# Patient Record
(demographics unavailable — no encounter records)

---

## 2024-10-31 NOTE — HISTORY OF PRESENT ILLNESS
[FreeTextEntry1] : Damian is a 15 y/o male with ADHD and is here for a follow up s/p seen 08/14/24 and increasing Adderall XR to 20 mg.  He was initially seen 07/24/24 for management recommendations and was started on Adderall XR 10 mg.  He was diagnosed with ADHD when he was in 5th grade by [facility] and had never taken ADHD medication.  He is a bright student however in the past year has been struggling academically.  Mother was considering having him in therapy at [facility] in [place].  PLAN FROM PREVIOUS VISIT - Increase Adderall XR to 20 mg once daily (may take 2X 10 mg capsules for 5 days first, call if side effects). -Psychoeducation provided re: ADHD.  Resources for education given -Discussed management for ADHD including behavioral interventions and medication options. -Counseling on ADHD medication; stimulants are 1st line treatment. Discussed stimulant medications; risks, benefits, possible side effects. -Letter to request 504 Plan for school previously provided -Mother taking him to [facility] to see if therapist would be indicated.  -Follow up in 1 month (mother may call next week if not tolerating Adderall XR 20 mg, to switch to Concerta 18 mg).   INTERIM HPI  Taking Adderall XR 25 mg (did not have 20 mg in stock). Patient reports he notices it helps him to stay focused, though still loses focus in science class which is  a double period. He doesn't have a lot of homework. He found transition to high school to be smooth, and is doing better in school this year.  No missing assignments.  He denies problems taking medication.  Has been eating and sleeping well.    Father is happy with improvement in school.  He agreed no missing assignments "which is huge."   Denies problems with mood.    HPI FROM VISIT ON 08/14/24  Damian is a 15 y/o male with ADHD and is here for a follow up s/p seen 07/24/24 for management recommendations.  He was diagnosed with ADHD when he was in 5th grade by [facility] and has never taken ADHD medication.  He is a bright student however in the past year has been struggling academically.  Recent [employer] forms were consistent with symptoms and impairments related to ADHD, and was started on Adderall XR 10 mg.    PLAN FROM PREVIOUS VISIT -Start Adderall XR 10 mg once daily in the morning (if not available, may try Concerta).  -Psychoeducation provided re: ADHD.  Resources for education given -Discussed management for ADHD including behavioral interventions and medication options. -Counseling on ADHD medication; stimulants are 1st line treatment. Discussed stimulant medications; risks, benefits, possible side effects. -Letter to request 504 Plan for school provided -Follow up in 3 weeks.    INTERIM HPI Has been taking Adderall XR 10 mg consistently.   Patient feels less talkative, no mood, but not depressed.  He has not noticed if it helps with focus as he's not in school.  Mother has not noticed any changes.  He's always in the basement, and he's usually very quiet.  Father took him to a mental health clinic (father had an appointment for himself and inquired about Damian) at [facility] in [place].  They said he could only take him if Adderall is not being prescribed by another provider.  Mother was not there.  The therapist noted that Damian fidgeted a lot. Damian denies feeling anxious or depressed.    HPI FROM VISIT ON 07/24/24  Damian is a 15 y/o male with ADHD, here for re-evaluation and management recommendations.   Patient lives with parents and is enrolled is enrolled in 8th grade at [employer] in [place].  He was diagnosed with ADHD in 5th grade at [facility].  He had an EEG and cardiology clearance.  Medication was not recommended by neurologist at the time as he was doing very well in school. He has been taking all honors classes, however started having behavior problems, and over the past 2 years, more problems with assignment completion, missing assignments, and disrupting class.  Mother feels she needs to help with stay organized and remind him constantly to get his work done.   Had [employer] forms repeated at the beginning of the school year last year which did not show significant teacher reported symptoms, so NP at [employer] refused to start ADHD medication, stated he didn't have enough symptoms and he did well in school.  By the end of the school years, his grades declined, he failed 2 regents tests, so Mother had [employer] forms repeated at the end of the year. [employer] Teacher forms completed on 05/06/24 provided by parent: TEACHER #1  (Kelly, 1A) Inattentive:  9/9 + Hyperactive: 5 /9- ODD/CD  2/10- Anxiety/depression:  not scored SIGNIFICANT IMPAIRMENTS IN ALL OF THE FOLLOWING  . Relationships with peers   Following directions - Disrupting class - Assignment completion-. Organizational skills.   TEACHER #2 : RAF Gutierrez (algebra) Inattentive:  4/9 Hyperactive:  0/9 ODD/CD  0/10 Anxiety/depression:  0 /7 IMPAIRMENTS IN MATH (4= SOMEWHAT OF A PROBLEM)  Mother had him going to [employer] as was failing math, though he did pass math [employer].  He was in [employer] math, next year will be in regular (non-honors). Will be in honors history and social studies, but no other honor classes.   Damian endorses he gets distracted in the class.  He gets very distracted by any noise, even if he hears a bird chirping.  He does his homework at home and gets bored and distracted easily.  He sometimes has a hard time staying seated.  Mother notices he can't not touch everything within reach. He can't sit still, always needs to be walking around.   He can't perform multiple tasks at once.  He loses everything; he lost his student ID 3 times.  He is very disorganized. He had a lot of missing assignments.   HIs teacher had to have teacher initial his assignment book to make sure he didn't forget to write it down.  Patient denies anxiety, depression, SI, or self-injurious behavior.  Patient also denies substance use including alcohol or marijuana.     DEVELOPMENTAL HX  Child was born full term without complications and reached all age-appropriate developmental milestones without concerns. Early Intervention Services were not needed.   CURRENT SCHOOL -School: [employer] in [place], 9th grade  -Public school  -Special Ed services-No 504 plan or IEP - General education classroom -Accommodations-none -Academic performance/grades: was on honor roll, grade declined.     MEDICAL HISTORY: Denies a history of tics Denies history of starting spells, twitching, seizure-like activity.   FAMILY HISTORY: Family history of ADHD: aunt, grandmother (takes Adderall XR) Family history of anxiety or depression: denies Denies family history of cardiac conditions or early unexplained deaths.

## 2024-10-31 NOTE — ASSESSMENT
[FreeTextEntry1] : Damian is a 15 y/o male with ADHD and is here for a follow up s/p seen 08/14/24 and increasing Adderall XR to 25 mg.  He was initially seen 07/24/24.  He was diagnosed with ADHD when he was in 5th grade by [facility] and had never taken ADHD medication.  He was struggling academically however this year doing much better.  Oppositional behaviors have improved.  Will increase Adderall XR to 30 mg as patient reporting noticeable improvement but still with some focusing difficulties.

## 2024-10-31 NOTE — ASSESSMENT
[FreeTextEntry1] : Damian is a 13 y/o male with ADHD and is here for a follow up s/p seen 08/14/24 and increasing Adderall XR to 25 mg.  He was initially seen 07/24/24.  He was diagnosed with ADHD when he was in 5th grade by [facility] and had never taken ADHD medication.  He was struggling academically however this year doing much better.  Oppositional behaviors have improved.  Will increase Adderall XR to 30 mg as patient reporting noticeable improvement but still with some focusing difficulties.

## 2024-10-31 NOTE — PHYSICAL EXAM
[Well-appearing] : well-appearing [Alert] : alert [Well related, good eye contact] : well related, good eye contact [Conversant] : conversant [Normal speech and language] : normal speech and language [Follows instructions well] : follows instructions well [Gross hearing intact] : gross hearing intact [de-identified] : Interactive and appropriate mood, friendly.

## 2024-10-31 NOTE — PHYSICAL EXAM
[Well-appearing] : well-appearing [Alert] : alert [Well related, good eye contact] : well related, good eye contact [Conversant] : conversant [Normal speech and language] : normal speech and language [Follows instructions well] : follows instructions well [Gross hearing intact] : gross hearing intact [de-identified] : Interactive and appropriate mood, friendly.

## 2024-10-31 NOTE — PLAN
[FreeTextEntry1] : - Increase Adderall XR to 30 mg -Psychoeducation provided re: ADHD.   - Discussed stimulant medications, risks, benefits, possible side effects. -Continue 504 Plan -Follow up in 3-4 months.

## 2024-10-31 NOTE — HISTORY OF PRESENT ILLNESS
[FreeTextEntry1] : Damian is a 13 y/o male with ADHD and is here for a follow up s/p seen 08/14/24 and increasing Adderall XR to 20 mg.  He was initially seen 07/24/24 for management recommendations and was started on Adderall XR 10 mg.  He was diagnosed with ADHD when he was in 5th grade by [facility] and had never taken ADHD medication.  He is a bright student however in the past year has been struggling academically.  Mother was considering having him in therapy at [facility] in [place].  PLAN FROM PREVIOUS VISIT - Increase Adderall XR to 20 mg once daily (may take 2X 10 mg capsules for 5 days first, call if side effects). -Psychoeducation provided re: ADHD.  Resources for education given -Discussed management for ADHD including behavioral interventions and medication options. -Counseling on ADHD medication; stimulants are 1st line treatment. Discussed stimulant medications; risks, benefits, possible side effects. -Letter to request 504 Plan for school previously provided -Mother taking him to [facility] to see if therapist would be indicated.  -Follow up in 1 month (mother may call next week if not tolerating Adderall XR 20 mg, to switch to Concerta 18 mg).   INTERIM HPI  Taking Adderall XR 25 mg (did not have 20 mg in stock). Patient reports he notices it helps him to stay focused, though still loses focus in science class which is  a double period. He doesn't have a lot of homework. He found transition to high school to be smooth, and is doing better in school this year.  No missing assignments.  He denies problems taking medication.  Has been eating and sleeping well.    Father is happy with improvement in school.  He agreed no missing assignments "which is huge."   Denies problems with mood.    HPI FROM VISIT ON 08/14/24  Damian is a 13 y/o male with ADHD and is here for a follow up s/p seen 07/24/24 for management recommendations.  He was diagnosed with ADHD when he was in 5th grade by [facility] and has never taken ADHD medication.  He is a bright student however in the past year has been struggling academically.  Recent [employer] forms were consistent with symptoms and impairments related to ADHD, and was started on Adderall XR 10 mg.    PLAN FROM PREVIOUS VISIT -Start Adderall XR 10 mg once daily in the morning (if not available, may try Concerta).  -Psychoeducation provided re: ADHD.  Resources for education given -Discussed management for ADHD including behavioral interventions and medication options. -Counseling on ADHD medication; stimulants are 1st line treatment. Discussed stimulant medications; risks, benefits, possible side effects. -Letter to request 504 Plan for school provided -Follow up in 3 weeks.    INTERIM HPI Has been taking Adderall XR 10 mg consistently.   Patient feels less talkative, no mood, but not depressed.  He has not noticed if it helps with focus as he's not in school.  Mother has not noticed any changes.  He's always in the basement, and he's usually very quiet.  Father took him to a mental health clinic (father had an appointment for himself and inquired about Damian) at [facility] in [place].  They said he could only take him if Adderall is not being prescribed by another provider.  Mother was not there.  The therapist noted that Damian fidgeted a lot. Damian denies feeling anxious or depressed.    HPI FROM VISIT ON 07/24/24  Damian is a 13 y/o male with ADHD, here for re-evaluation and management recommendations.   Patient lives with parents and is enrolled is enrolled in 8th grade at [employer] in [place].  He was diagnosed with ADHD in 5th grade at [facility].  He had an EEG and cardiology clearance.  Medication was not recommended by neurologist at the time as he was doing very well in school. He has been taking all honors classes, however started having behavior problems, and over the past 2 years, more problems with assignment completion, missing assignments, and disrupting class.  Mother feels she needs to help with stay organized and remind him constantly to get his work done.   Had [employer] forms repeated at the beginning of the school year last year which did not show significant teacher reported symptoms, so NP at [facility] refused to start ADHD medication, stated he didn't have enough symptoms and he did well in school.  By the end of the school years, his grades declined, he failed 2 regents tests, so Mother had [employer] forms repeated at the end of the year. [employer] Teacher forms completed on 05/06/24 provided by parent: TEACHER #1  (Kelly, 1A) Inattentive:  9/9 + Hyperactive: 5 /9- ODD/CD  2/10- Anxiety/depression:  not scored SIGNIFICANT IMPAIRMENTS IN ALL OF THE FOLLOWING  . Relationships with peers   Following directions - Disrupting class - Assignment completion-. Organizational skills.   TEACHER #2 : RAF Gutierrez (algebra) Inattentive:  4/9 Hyperactive:  0/9 ODD/CD  0/10 Anxiety/depression:  0 /7 IMPAIRMENTS IN MATH (4= SOMEWHAT OF A PROBLEM)  Mother had him going to [employer] as was failing math, though he did pass math [employer].  He was in [employer] math, next year will be in regular (non-honors). Will be in honors history and social studies, but no other honor classes.   Damian endorses he gets distracted in the class.  He gets very distracted by any noise, even if he hears a bird chirping.  He does his homework at home and gets bored and distracted easily.  He sometimes has a hard time staying seated.  Mother notices he can't not touch everything within reach. He can't sit still, always needs to be walking around.   He can't perform multiple tasks at once.  He loses everything; he lost his student ID 3 times.  He is very disorganized. He had a lot of missing assignments.   HIs teacher had to have teacher initial his assignment book to make sure he didn't forget to write it down.  Patient denies anxiety, depression, SI, or self-injurious behavior.  Patient also denies substance use including alcohol or marijuana.     DEVELOPMENTAL HX  Child was born full term without complications and reached all age-appropriate developmental milestones without concerns. Early Intervention Services were not needed.   CURRENT SCHOOL -School: [employer] in [place], 9th grade  -Public school  -Special Ed services-No 504 plan or IEP - General education classroom -Accommodations-none -Academic performance/grades: was on honor roll, grade declined.     MEDICAL HISTORY: Denies a history of tics Denies history of starting spells, twitching, seizure-like activity.   FAMILY HISTORY: Family history of ADHD: aunt, grandmother (takes Adderall XR) Family history of anxiety or depression: denies Denies family history of cardiac conditions or early unexplained deaths.

## 2025-04-14 NOTE — CONSULT LETTER
[Courtesy Letter:] : I had the pleasure of seeing your patient, [unfilled], in my office today. [Please see my note below.] : Please see my note below. [Sincerely,] : Sincerely, [FreeTextEntry3] : Jessica Menendez, PNP-PC, [facility] Division of Pediatric Neurology [address], [address] [id number] [phone]

## 2025-04-14 NOTE — REASON FOR VISIT
[Patient] : patient [Mother] : mother [Home] : at home, [unfilled] , at the time of the visit. [Medical Office: ([facility])___] : at the medical office located in  [Telehealth (audio & video)] : This visit was provided via telehealth using real-time 2-way audio visual technology. [FreeTextEntry3] : mother

## 2025-04-14 NOTE — CONSULT LETTER
[Courtesy Letter:] : I had the pleasure of seeing your patient, [unfilled], in my office today. [Please see my note below.] : Please see my note below. [Sincerely,] : Sincerely, [FreeTextEntry3] : Jessica Menendez, PNP-PC, [facility] Division of Pediatric Neurology [address], [address] [address] [phone]

## 2025-04-14 NOTE — HISTORY OF PRESENT ILLNESS
[FreeTextEntry1] : Damian is a 13 y/o (almost 15 y/o)  male with ADHD and is here for a follow up s/p seen 10/30/25 and increasing Adderall XR to 39 mg.  He was initially seen 07/24/24.  He was diagnosed with ADHD when he was in 5th grade by [facility] and had never taken ADHD medication.  He was struggling academically however this year doing much better.  Oppositional behaviors had improved.  On Adderall XR 25 mg patient reported noticeable improvement but still with some focusing difficulties.  PLAN FROM PREVIOUS VISIT - Increase Adderall XR to 30 mg -Psychoeducation provided re: ADHD.   - Discussed stimulant medications, risks, benefits, possible side effects. -Continue 504 Plan -Follow up in 3-4 months.   INTERIM HPI Spoke to father 11/27/24, was having Adderall shortage issues' Sent Rx for 20 mg IR Adderall BID, recently refilled Adderall XR 30 mg (01/07 and 02/18/25). Taking Adderall XR 30 mg but not consistent as doesn't like how he feels. Patient feels he is more calm, doesn't talk as much, doesn't eat as much.  Doesn't want to do anything.  Mother feels when he doesn't take the medicine, he is much more annoying to others. She thinks he doesn't always take it. Grades are "terrible" Some 60's and 70's.  He gets 100 on homework which is done in school.  The quizzes and tests are the problems.   He comes home and rushes through his work.   No 504 Plan- doesn't feel he needs it.       HPI FROM VISIT ON 10/30/25  Damian is a 13 y/o male with ADHD and is here for a follow up s/p seen 08/14/24 and increasing Adderall XR to 20 mg.  He was initially seen 07/24/24 for management recommendations and was started on Adderall XR 10 mg.  He was diagnosed with ADHD when he was in 5th grade by [facility] and had never taken ADHD medication.  He is a bright student however in the past year has been struggling academically.  [facility] was [facility] [place].  PLAN FROM PREVIOUS VISIT - Increase Adderall XR to 20 mg once daily (may take 2X 10 mg capsules for 5 days first, call if side effects). -Psychoeducation provided re: ADHD.  Resources for education given -Discussed management for ADHD including behavioral interventions and medication options. -Counseling on ADHD medication; stimulants are 1st line treatment. Discussed stimulant medications; risks, benefits, possible side effects. -Letter to request 504 Plan for school previously provided -Mother taking him to [facility] to see if therapist would be indicated.  -Follow up in 1 month (mother may call next week if not tolerating Adderall XR 20 mg, to switch to Concerta 18 mg).   INTERIM HPI  Taking Adderall XR 25 mg (did not have 20 mg in stock). Patient reports he notices it helps him to stay focused, though still loses focus in science class which is  a double period. He doesn't have a lot of homework. He found transition to high school to be smooth, and is doing better in school this year.  No missing assignments.  He denies problems taking medication.  Has been eating and sleeping well.    Father is happy with improvement in school.  He agreed no missing assignments "which is huge."   Denies problems with mood.    HPI FROM VISIT ON 08/14/24  Damian is a 13 y/o male with ADHD and is here for a follow up s/p seen 07/24/24 for management recommendations.  He was diagnosed with ADHD when he was in 5th grade by [facility] and has never taken ADHD medication.  He is a bright student however in the past year has been struggling academically.  Recent [place] forms were consistent with symptoms and impairments related to ADHD, and was started on Adderall XR 10 mg.    PLAN FROM PREVIOUS VISIT -Start Adderall XR 10 mg once daily in the morning (if not available, may try Concerta).  -Psychoeducation provided re: ADHD.  Resources for education given -Discussed management for ADHD including behavioral interventions and medication options. -Counseling on ADHD medication; stimulants are 1st line treatment. Discussed stimulant medications; risks, benefits, possible side effects. -Letter to request 504 Plan for school provided -Follow up in 3 weeks.    INTERIM HPI Has been taking Adderall XR 10 mg consistently.   Patient feels less talkative, no mood, but not depressed.  He has not noticed if it helps with focus as he's not in school.  Mother has not noticed any changes.  He's always in the basement, and he's usually very quiet.  Father took him to a mental health clinic (father had an appointment for himself and inquired about Damian) at [facility] in [place].  They said he could only take him if Adderall is not being prescribed by another provider.  Mother was not there.  The therapist noted that Damian fidgeted a lot. Damian denies feeling anxious or depressed.    HPI FROM VISIT ON 07/24/24  Damian is a 13 y/o male with ADHD, here for re-evaluation and management recommendations.   Patient lives with parents and is enrolled is enrolled in 8th grade at [employer] in [place].  He was diagnosed with ADHD in 5th grade at [facility].  He had an EEG and cardiology clearance.  Medication was not recommended by neurologist at the time as he was doing very well in school. He has been taking all honors classes, however started having behavior problems, and over the past 2 years, more problems with assignment completion, missing assignments, and disrupting class.  Mother feels she needs to help with stay organized and remind him constantly to get his work done.   Had [place] forms repeated at the beginning of the school year last year which did not show significant teacher reported symptoms, so NP at [facility] refused to start ADHD medication, stated he didn't have enough symptoms and he did well in school.  By the end of the school years, his grades declined, he failed 2 regents tests, so Mother had [place] forms repeated at the end of the year. [place] Teacher forms completed on 05/06/24 provided by parent: TEACHER #1  (Kelly, KAM) Inattentive:  9/9 + Hyperactive: 5 /9- ODD/CD  2/10- Anxiety/depression:  not scored SIGNIFICANT IMPAIRMENTS IN ALL OF THE FOLLOWING  . Relationships with peers   Following directions - Disrupting class - Assignment completion-. Organizational skills.   TEACHER #2 : [employer] partida) Inattentive:  4/9 Hyperactive:  0/9 ODD/CD  0/10 Anxiety/depression:  0 /7 IMPAIRMENTS IN MATH (4= SOMEWHAT OF A PROBLEM)  Mother had him going to [employer] as was failing math, though he did pass math regents.  He was in regents' math, next year will be in regular (non-honors). Will be in honors history and social studies, but no other honor classes.   Damian endorses he gets distracted in the class.  He gets very distracted by any noise, even if he hears a bird chirping.  He does his homework at home and gets bored and distracted easily.  He sometimes has a hard time staying seated.  Mother notices he can't not touch everything within reach. He can't sit still, always needs to be walking around.   He can't perform multiple tasks at once.  He loses everything; he lost his student ID 3 times.  He is very disorganized. He had a lot of missing assignments.   HIs teacher had to have teacher initial his assignment book to make sure he didn't forget to write it down.  Patient denies anxiety, depression, SI, or self-injurious behavior.  Patient also denies substance use including alcohol or marijuana.     DEVELOPMENTAL HX  Child was born full term without complications and reached all age-appropriate developmental milestones without concerns. Early Intervention Services were not needed.   CURRENT SCHOOL -School: [employer] in [place], 9th grade  -Public school  -Special Ed services-No 504 plan or IEP - General education classroom -Accommodations-none -Academic performance/grades: was on honor roll, grade declined.     MEDICAL HISTORY: Denies a history of tics Denies history of starting spells, twitching, seizure-like activity.   FAMILY HISTORY: Family history of ADHD: aunt, grandmother (takes Adderall XR) Family history of anxiety or depression: denies Denies family history of cardiac conditions or early unexplained deaths.

## 2025-04-14 NOTE — ASSESSMENT
[FreeTextEntry1] : Damian is a 13 y/o male with ADHD and is here for a follow up s/p seen 08/14/24 and increasing Adderall XR to 30 mg.  He was initially seen 07/24/24.  He was diagnosed with ADHD when he was in 5th grade by [facility] and had never taken ADHD medication.  He was struggling academically, and initially seemed to be doing better however grades have declined.   He does not have a 504 Plan as per patient preference.   Adderall XR 30 mg has some effectiveness with improvement in oppositional behaviors, however not significantly improving academics, and patient complains of side effects (feeling too quiet and not himself).  Will switch to a methylphenidate formulation such as Concerta.

## 2025-04-14 NOTE — HISTORY OF PRESENT ILLNESS
[FreeTextEntry1] : Damian is a 13 y/o (almost 15 y/o)  male with ADHD and is here for a follow up s/p seen 10/30/25 and increasing Adderall XR to 39 mg.  He was initially seen 07/24/24.  He was diagnosed with ADHD when he was in 5th grade by [facility] and had never taken ADHD medication.  He was struggling academically however this year doing much better.  Oppositional behaviors had improved.  On Adderall XR 25 mg patient reported noticeable improvement but still with some focusing difficulties.  PLAN FROM PREVIOUS VISIT - Increase Adderall XR to 30 mg -Psychoeducation provided re: ADHD.   - Discussed stimulant medications, risks, benefits, possible side effects. -Continue 504 Plan -Follow up in 3-4 months.   INTERIM HPI Spoke to father 11/27/24, was having Adderall shortage issues' Sent Rx for 20 mg IR Adderall BID, recently refilled Adderall XR 30 mg (01/07 and 02/18/25). Taking Adderall XR 30 mg but not consistent as doesn't like how he feels. Patient feels he is more calm, doesn't talk as much, doesn't eat as much.  Doesn't want to do anything.  Mother feels when he doesn't take the medicine, he is much more annoying to others. She thinks he doesn't always take it. Grades are "terrible" Some 60's and 70's.  He gets 100 on homework which is done in school.  The quizzes and tests are the problems.   He comes home and rushes through his work.   No 504 Plan- doesn't feel he needs it.       HPI FROM VISIT ON 10/30/25  Damian is a 13 y/o male with ADHD and is here for a follow up s/p seen 08/14/24 and increasing Adderall XR to 20 mg.  He was initially seen 07/24/24 for management recommendations and was started on Adderall XR 10 mg.  He was diagnosed with ADHD when he was in 5th grade by [facility] and had never taken ADHD medication.  He is a bright student however in the past year has been struggling academically.  Mother was considering having him in therapy at [facility] in [place].  PLAN FROM PREVIOUS VISIT - Increase Adderall XR to 20 mg once daily (may take 2X 10 mg capsules for 5 days first, call if side effects). -Psychoeducation provided re: ADHD.  Resources for education given -Discussed management for ADHD including behavioral interventions and medication options. -Counseling on ADHD medication; stimulants are 1st line treatment. Discussed stimulant medications; risks, benefits, possible side effects. -Letter to request 504 Plan for school previously provided -Mother taking him to [facility] to see if therapist would be indicated.  -Follow up in 1 month (mother may call next week if not tolerating Adderall XR 20 mg, to switch to Concerta 18 mg).   INTERIM HPI  Taking Adderall XR 25 mg (did not have 20 mg in stock). Patient reports he notices it helps him to stay focused, though still loses focus in science class which is  a double period. He doesn't have a lot of homework. He found transition to high school to be smooth, and is doing better in school this year.  No missing assignments.  He denies problems taking medication.  Has been eating and sleeping well.    Father is happy with improvement in school.  He agreed no missing assignments "which is huge."   Denies problems with mood.    HPI FROM VISIT ON 08/14/24  Damian is a 13 y/o male with ADHD and is here for a follow up s/p seen 07/24/24 for management recommendations.  He was diagnosed with ADHD when he was in 5th grade by [facility] and has never taken ADHD medication.  He is a bright student however in the past year has been struggling academically.  Recent [place] forms were consistent with symptoms and impairments related to ADHD, and was started on Adderall XR 10 mg.    PLAN FROM PREVIOUS VISIT -Start Adderall XR 10 mg once daily in the morning (if not available, may try Concerta).  -Psychoeducation provided re: ADHD.  Resources for education given -Discussed management for ADHD including behavioral interventions and medication options. -Counseling on ADHD medication; stimulants are 1st line treatment. Discussed stimulant medications; risks, benefits, possible side effects. -Letter to request 504 Plan for school provided -Follow up in 3 weeks.    INTERIM HPI Has been taking Adderall XR 10 mg consistently.   Patient feels less talkative, no mood, but not depressed.  He has not noticed if it helps with focus as he's not in school.  Mother has not noticed any changes.  He's always in the basement, and he's usually very quiet.  Father took him to a mental health clinic (father had an appointment for himself and inquired about Damian) at [facility] in [place].  They said he could only take him if Adderall is not being prescribed by another provider.  Mother was not there.  The therapist noted that Damian fidgeted a lot. Damian denies feeling anxious or depressed.    HPI FROM VISIT ON 07/24/24  Damian is a 13 y/o male with ADHD, here for re-evaluation and management recommendations.   Patient lives with parents and is enrolled is enrolled in 8th grade at [employer] in [place].  He was diagnosed with ADHD in 5th grade at [facility].  He had an EEG and cardiology clearance.  Medication was not recommended by neurologist at the time as he was doing very well in school. He has been taking all honors classes, however started having behavior problems, and over the past 2 years, more problems with assignment completion, missing assignments, and disrupting class.  Mother feels she needs to help with stay organized and remind him constantly to get his work done.   Had [place] forms repeated at the beginning of the school year last year which did not show significant teacher reported symptoms, so NP at [facility] refused to start ADHD medication, stated he didn't have enough symptoms and he did well in school.  By the end of the school years, his grades declined, he failed 2 regents tests, so Mother had [place] forms repeated at the end of the year. [employer] Teacher forms completed on 05/06/24 provided by parent: TEACHER #1  (Kelly, KAM) Inattentive:  9/9 + Hyperactive: 5 /9- ODD/CD  2/10- Anxiety/depression:  not scored SIGNIFICANT IMPAIRMENTS IN ALL OF THE FOLLOWING  . Relationships with peers   Following directions - Disrupting class - Assignment completion-. Organizational skills.   TEACHER #2 : [employer] partida) Inattentive:  4/9 Hyperactive:  0/9 ODD/CD  0/10 Anxiety/depression:  0 /7 IMPAIRMENTS IN MATH (4= SOMEWHAT OF A PROBLEM)  Mother had him going to [employer] as was failing math, though he did pass math regents.  He was in regents' math, next year will be in regular (non-honors). Will be in honors history and social studies, but no other honor classes.   Damian endorses he gets distracted in the class.  He gets very distracted by any noise, even if he hears a bird chirping.  He does his homework at home and gets bored and distracted easily.  He sometimes has a hard time staying seated.  Mother notices he can't not touch everything within reach. He can't sit still, always needs to be walking around.   He can't perform multiple tasks at once.  He loses everything; he lost his student ID 3 times.  He is very disorganized. He had a lot of missing assignments.   HIs teacher had to have teacher initial his assignment book to make sure he didn't forget to write it down.  Patient denies anxiety, depression, SI, or self-injurious behavior.  Patient also denies substance use including alcohol or marijuana.     DEVELOPMENTAL HX  Child was born full term without complications and reached all age-appropriate developmental milestones without concerns. Early Intervention Services were not needed.   CURRENT SCHOOL -School: [employer] in [place], 9th grade  -Public school  -Special Ed services-No 504 plan or IEP - General education classroom -Accommodations-none -Academic performance/grades: was on honor roll, grade declined.     MEDICAL HISTORY: Denies a history of tics Denies history of starting spells, twitching, seizure-like activity.   FAMILY HISTORY: Family history of ADHD: aunt, grandmother (takes Adderall XR) Family history of anxiety or depression: denies Denies family history of cardiac conditions or early unexplained deaths.

## 2025-04-14 NOTE — ASSESSMENT
[FreeTextEntry1] : Damian is a 13 y/o male with ADHD and is here for a follow up s/p seen 08/14/24 and increasing Adderall XR to 30 mg.  He was initially seen 07/24/24.  He was diagnosed with ADHD when he was in 5th grade by [facility] and had never taken ADHD medication.  He was struggling academically, and initially seemed to be doing better however grades have declined.   He does not have a 504 Plan as per patient preference.   Adderall XR 30 mg has some effectiveness with improvement in oppositional behaviors, however not significantly improving academics, and patient complains of side effects (feeling too quiet and not himself).  Will switch to a methylphenidate formulation such as Concerta.

## 2025-04-14 NOTE — PHYSICAL EXAM
[Well-appearing] : well-appearing [Alert] : alert [Well related, good eye contact] : well related, good eye contact [de-identified] : Interactive and appropriate mood, friendly.

## 2025-04-14 NOTE — REASON FOR VISIT
[Patient] : patient [Mother] : mother [Home] : at home, [unfilled] , at the time of the visit. [Medical Office: ([facility])___] : at the medical office located in  [Telehealth (audio & video)] : This visit was provided via telehealth using real-time 2-way audio visual technology. [FreeTextEntry3] : mother

## 2025-04-14 NOTE — PHYSICAL EXAM
[Well-appearing] : well-appearing [Alert] : alert [Well related, good eye contact] : well related, good eye contact [de-identified] : Interactive and appropriate mood, friendly.

## 2025-04-14 NOTE — PLAN
[FreeTextEntry1] : -STOP Adderall XR 30 mg START  Start Concerta 18 mg once daily in the morning.   May increase to 2 X 18 mg tablets after 2 weeks if well tolerated and not effective..   - Discussed stimulant medications, risks, benefits, possible side effects. -Discussed 504 Plan- discussed benefits; patient/parent not interested. -Follow up in 6 weeks.

## 2025-04-16 NOTE — PHYSICAL EXAM
[TextEntry] : Dorsalis pedis and posterior tibial pulses were palpable and equal bilat.  The capillary return was instant bilat.  The lateral aspect of the left hallux nail is erythematous and the nail fold is fluctuant.  Pain upon palpation of the area is present.  Ecchymosis is absent.

## 2025-04-16 NOTE — ASSESSMENT
[FreeTextEntry1] : Assessment: Paronychial infection, left hallux lateral aspect.  Plan: I removed the distal lateral aspect of the left hallux nail, and then performed an incision and drainage of the paronychia.  Purulent discharge was expressed.    I curettaged the offending nail border.  I copiously flushed the area with sterile saline and applied Amerigel with a sterile dressing.  Patient was advised to soak the foot is lukewarm water with Epsom salts, 2 tablespoons per pint for 20 minutes twice per day.  I advised the patient to dry the feet with a clean towel and apply sterile dressing to the area for the next 3 days. I advised the patient to notify the office right away if increased redness, swelling, pain, open wounds or discharge were observed. PTR:  10 days.

## 2025-04-16 NOTE — HISTORY OF PRESENT ILLNESS
[FreeTextEntry1] : Damian Corrales is a 14-year-old male patient who presents today with his mother for his initial visit.  He states that yesterday he started complaining of pain in his left great toe.  He states that it was really sore yesterday and a little better today and he was trying to convince his mother he did not need to come in, but she brought him anyway.  He states that the toe hurts with any pressure on it. They deny seeing any pus or discharge from the toe.

## 2025-05-30 NOTE — REASON FOR VISIT
[Follow-Up Evaluation] : a follow-up evaluation for [ADHD] : ADHD [Mother] : mother [Home] : at home, [unfilled] , at the time of the visit. [Medical Office: ([facility])___] : at the medical office located in  [Telehealth (audio & video)] : This visit was provided via telehealth using real-time 2-way audio visual technology. [FreeTextEntry3] : mother

## 2025-05-30 NOTE — HISTORY OF PRESENT ILLNESS
[FreeTextEntry1] : Damian is a 15 y/o male with ADHD and is here for a follow up s/p seen 04/14/25. when stimulant was swtched from Adderall XR 30 mg to Concerta..  He was diagnosed with ADHD when he was in 5th grade by [facility] and had never taken ADHD medication.  He was struggling academically, and initially seemed to be doing better however grades have declined.   He does not have a 504 Plan as per patient preference.   Adderall XR 30 mg had some effectiveness with improvement in oppositional behaviors, however not significantly improving academics, and patient complains of side effects (feeling too quiet and not himself).  PLAN FROM PREVIOUS VISIT -STOP Adderall XR 30 mg START  Start Concerta 18 mg once daily in the morning.   May increase to 2 X 18 mg tablets after 2 weeks if well tolerated and not effective..   - Discussed stimulant medications, risks, benefits, possible side effects. -Discussed 504 Plan- discussed benefits; patient/parent not interested. -Follow up in 6 weeks.  INTERIM HPI   HPI FROM VISIT ON 04/14/25  Damian is a 15 y/o (almost 15 y/o)  male with ADHD and is here for a follow up s/p seen 10/30/25 and increasing Adderall XR to 39 mg.  He was initially seen 07/24/24.  He was diagnosed with ADHD when he was in 5th grade by [facility] and had never taken ADHD medication.  He was struggling academically however this year doing much better.  Oppositional behaviors had improved.  On Adderall XR 25 mg patient reported noticeable improvement but still with some focusing difficulties.  PLAN FROM PREVIOUS VISIT - Increase Adderall XR to 30 mg -Psychoeducation provided re: ADHD.   - Discussed stimulant medications, risks, benefits, possible side effects. -Continue 504 Plan -Follow up in 3-4 months.   INTERIM HPI Spoke to father 11/27/24, was having Adderall shortage issues' Sent Rx for 20 mg IR Adderall BID, recently refilled Adderall XR 30 mg (01/07 and 02/18/25). Taking Adderall XR 30 mg but not consistent as doesn't like how he feels. Patient feels he is more calm, doesn't talk as much, doesn't eat as much.  Doesn't want to do anything.  Mother feels when he doesn't take the medicine, he is much more annoying to others. She thinks he doesn't always take it. Grades are "terrible" Some 60's and 70's.  He gets 100 on homework which is done in school.  The quizzes and tests are the problems.   He comes home and rushes through his work.   No 504 Plan- doesn't feel he needs it.       HPI FROM VISIT ON 10/30/25  Damian is a 15 y/o male with ADHD and is here for a follow up s/p seen 08/14/24 and increasing Adderall XR to 20 mg.  He was initially seen 07/24/24 for management recommendations and was started on Adderall XR 10 mg.  He was diagnosed with ADHD when he was in 5th grade by [facility] and had never taken ADHD medication.  He is a bright student however in the past year has been struggling academically.  Mother was considering having him in therapy at [facility] in [place].  PLAN FROM PREVIOUS VISIT - Increase Adderall XR to 20 mg once daily (may take 2X 10 mg capsules for 5 days first, call if side effects). -Psychoeducation provided re: ADHD.  Resources for education given -Discussed management for ADHD including behavioral interventions and medication options. -Counseling on ADHD medication; stimulants are 1st line treatment. Discussed stimulant medications; risks, benefits, possible side effects. -Letter to request 504 Plan for school previously provided -Mother taking him to [facility] to see if therapist would be indicated.  -Follow up in 1 month (mother may call next week if not tolerating Adderall XR 20 mg, to switch to Concerta 18 mg).   INTERIM HPI  Taking Adderall XR 25 mg (did not have 20 mg in stock). Patient reports he notices it helps him to stay focused, though still loses focus in science class which is  a double period. He doesn't have a lot of homework. He found transition to high school to be smooth, and is doing better in school this year.  No missing assignments.  He denies problems taking medication.  Has been eating and sleeping well.    Father is happy with improvement in school.  He agreed no missing assignments "which is huge."   Denies problems with mood.    HPI FROM VISIT ON 08/14/24  Damian is a 15 y/o male with ADHD and is here for a follow up s/p seen 07/24/24 for management recommendations.  He was diagnosed with ADHD when he was in 5th grade by [facility] and has never taken ADHD medication.  He is a bright student however in the past year has been struggling academically.  Recent [employer] forms were consistent with symptoms and impairments related to ADHD, and was started on Adderall XR 10 mg.    PLAN FROM PREVIOUS VISIT -Start Adderall XR 10 mg once daily in the morning (if not available, may try Concerta).  -Psychoeducation provided re: ADHD.  Resources for education given -Discussed management for ADHD including behavioral interventions and medication options. -Counseling on ADHD medication; stimulants are 1st line treatment. Discussed stimulant medications; risks, benefits, possible side effects. -Letter to request 504 Plan for school provided -Follow up in 3 weeks.    INTERIM HPI Has been taking Adderall XR 10 mg consistently.   Patient feels less talkative, no mood, but not depressed.  He has not noticed if it helps with focus as he's not in school.  Mother has not noticed any changes.  He's always in the basement, and he's usually very quiet.  Father took him to a mental health clinic (father had an appointment for himself and inquired about Damian) at [facility] in [place].  They said he could only take him if Adderall is not being prescribed by another provider.  Mother was not there.  The therapist noted that Damian fidgeted a lot. Damian denies feeling anxious or depressed.    HPI FROM VISIT ON 07/24/24  Damian is a 15 y/o male with ADHD, here for re-evaluation and management recommendations.   Patient lives with parents and is enrolled is enrolled in 8th grade at [employer] in [place].  He was diagnosed with ADHD in 5th grade at [facility].  He had an EEG and cardiology clearance.  Medication was not recommended by neurologist at the time as he was doing very well in school. He has been taking all honors classes, however started having behavior problems, and over the past 2 years, more problems with assignment completion, missing assignments, and disrupting class.  Mother feels she needs to help with stay organized and remind him constantly to get his work done.   Had [employer] forms repeated at the beginning of the school year last year which did not show significant teacher reported symptoms, so NP at [employer] refused to start ADHD medication, stated he didn't have enough symptoms and he did well in school.  By the end of the school years, his grades declined, he failed 2 regents tests, so Mother had [employer] forms repeated at the end of the year. [employer] Teacher forms completed on 05/06/24 provided by parent: TEACHER #1  (Kelly, 1A) Inattentive:  9/9 + Hyperactive: 5 /9- ODD/CD  2/10- Anxiety/depression:  not scored SIGNIFICANT IMPAIRMENTS IN ALL OF THE FOLLOWING  . Relationships with peers   Following directions - Disrupting class - Assignment completion-. Organizational skills.   TEACHER #2 : RAF Gutierrez (algebra) Inattentive:  4/9 Hyperactive:  0/9 ODD/CD  0/10 Anxiety/depression:  0 /7 IMPAIRMENTS IN MATH (4= SOMEWHAT OF A PROBLEM)  Mother had him going to [employer] as was failing math, though he did pass math [employer].  He was in [employer] math, next year will be in regular (non-honors). Will be in honors history and social studies, but no other honor classes.   Damian endorses he gets distracted in the class.  He gets very distracted by any noise, even if he hears a bird chirping.  He does his homework at home and gets bored and distracted easily.  He sometimes has a hard time staying seated.  Mother notices he can't not touch everything within reach. He can't sit still, always needs to be walking around.   He can't perform multiple tasks at once.  He loses everything; he lost his student ID 3 times.  He is very disorganized. He had a lot of missing assignments.   HIs teacher had to have teacher initial his assignment book to make sure he didn't forget to write it down.  Patient denies anxiety, depression, SI, or self-injurious behavior.  Patient also denies substance use including alcohol or marijuana.     DEVELOPMENTAL HX  Child was born full term without complications and reached all age-appropriate developmental milestones without concerns. Early Intervention Services were not needed.   CURRENT SCHOOL -School: [employer] in [place], 9th grade  -Public school  -Special Ed services-No 504 plan or IEP - General education classroom -Accommodations-none -Academic performance/grades: was on honor roll, grade declined.     MEDICAL HISTORY: Denies a history of tics Denies history of starting spells, twitching, seizure-like activity.   FAMILY HISTORY: Family history of ADHD: aunt, grandmother (takes Adderall XR) Family history of anxiety or depression: denies Denies family history of cardiac conditions or early unexplained deaths.

## 2025-05-30 NOTE — ASSESSMENT
[FreeTextEntry1] : Damian is a 15 y/o male with ADHD and is here for a follow up s/p seen 04/14/25. when stimulant was swtched from Adderall XR 30 mg to Concerta..  He was diagnosed with ADHD when he was in 5th grade by [facility] and had never taken ADHD medication.  He was struggling academically, and initially seemed to be doing better however grades have declined.   He does not have a 504 Plan as per patient preference.   Adderall XR 30 mg had some effectiveness with improvement in oppositional behaviors, however not significantly improving academics, and patient complains of side effects (feeling too quiet and not himself). Concerta 36 mg

## 2025-05-30 NOTE — PLAN
[FreeTextEntry1] : -Concerta --- - Discussed stimulant medications, risks, benefits, possible side effects. -Discussed 504 Plan- discussed benefits; patient/parent not interested. -Follow up in 6 weeks.

## 2025-06-09 NOTE — HISTORY OF PRESENT ILLNESS
[FreeTextEntry1] : Damian is a 15 y/o male with ADHD and is here for a follow up s/p seen 04/14/25. when stimulant was swtched from Adderall XR 30 mg to Concerta..  He was diagnosed with ADHD when he was in 5th grade by [facility] and had never taken ADHD medication.  He was struggling academically, and initially seemed to be doing better however grades have declined.   He does not have a 504 Plan as per patient preference.   Adderall XR 30 mg had some effectiveness with improvement in oppositional behaviors, however not significantly improving academics, and patient complains of side effects (feeling too quiet and not himself).  PLAN FROM PREVIOUS VISIT -STOP Adderall XR 30 mg START  Concerta 18 mg once daily in the morning.   May increase to 2 X 18 mg tablets after 2 weeks if well tolerated and not effective..    INTERIM HPI  Has been taking Concerta 36 mg. Damian endorses he likes Concerta a lot better than Adderall.  He feels the same amount of focus as Mikhail but does not feel daniels like when he took Adderall.  Yesterday he took Concerta 30 min before his baseball game. and felt more anxious when he got up to bat.  Hasn't felt any side effects or anxiousness before then.    Mother feels he is doing well in school, however she can't see any changes at home. He is not doing the homework.    This summer has no formal plans.  Damian would prefer not to take ADHD meds.   _________________________________________________ HPI FROM VISIT ON 04/14/25  PLAN FROM PREVIOUS VISIT - Increase Adderall XR to 30 mg  INTERIM HPI Spoke to father 11/27/24, was having Adderall shortage issues' Sent Rx for 20 mg IR Adderall BID, recently refilled Adderall XR 30 mg (01/07 and 02/18/25). Taking Adderall XR 30 mg but not consistent as doesn't like how he feels. Patient feels he is more calm, doesn't talk as much, doesn't eat as much.  Doesn't want to do anything.  Mother feels when he doesn't take the medicine, he is much more annoying to others. She thinks he doesn't always take it. Grades are "terrible" Some 60's and 70's.  He gets 100 on homework which is done in school.  The quizzes and tests are the problems.   He comes home and rushes through his work.   No 504 Plan- doesn't feel he needs it.   __________________________________ HPI FROM VISIT ON 10/30/25  Taking Adderall XR 25 mg (did not have 20 mg in stock). Patient reports he notices it helps him to stay focused, though still loses focus in science class which is  a double period. He doesn't have a lot of homework. He found transition to high school to be smooth, and is doing better in school this year.  No missing assignments.  He denies problems taking medication.  Has been eating and sleeping well.    Father is happy with improvement in school.  He agreed no missing assignments "which is huge."   Denies problems with mood.   ________________________________ HPI FROM VISIT ON 08/14/24  Damian is a 13 y/o male with ADHD and is here for a follow up s/p seen 07/24/24 for management recommendations.  He was diagnosed with ADHD when he was in 5th grade by [facility] and has never taken ADHD medication.  He is a bright student however in the past year has been struggling academically.  Recent [employer] forms were consistent with symptoms and impairments related to ADHD, and was started on Adderall XR 10 mg.    PLAN FROM PREVIOUS VISIT -Start Adderall XR 10 mg once daily in the morning (if not available, may try Concerta).  -Psychoeducation provided re: ADHD.  Resources for education given -Discussed management for ADHD including behavioral interventions and medication options. -Counseling on ADHD medication; stimulants are 1st line treatment. Discussed stimulant medications; risks, benefits, possible side effects. -Letter to request 504 Plan for school provided -Follow up in 3 weeks.    INTERIM HPI Has been taking Adderall XR 10 mg consistently.   Patient feels less talkative, no mood, but not depressed.  He has not noticed if it helps with focus as he's not in school.  Mother has not noticed any changes.  He's always in the basement, and he's usually very quiet.  Father took him to a mental health clinic (father had an appointment for himself and inquired about Damian) at [facility] in [place].  They said he could only take him if Adderall is not being prescribed by another provider.  Mother was not there.  The therapist noted that Damian fidgeted a lot. Damian denies feeling anxious or depressed.    HPI FROM VISIT ON 07/24/24  Damian is a 13 y/o male with ADHD, here for re-evaluation and management recommendations.   Patient lives with parents and is enrolled is enrolled in 8th grade at [employer] in [place].  He was diagnosed with ADHD in 5th grade at [facility].  He had an EEG and cardiology clearance.  Medication was not recommended by neurologist at the time as he was doing very well in school. He has been taking all honors classes, however started having behavior problems, and over the past 2 years, more problems with assignment completion, missing assignments, and disrupting class.  Mother feels she needs to help with stay organized and remind him constantly to get his work done.   Had [employer] forms repeated at the beginning of the school year last year which did not show significant teacher reported symptoms, so NP at [facility] refused to start ADHD medication, stated he didn't have enough symptoms and he did well in school.  By the end of the school years, his grades declined, he failed 2 regents tests, so Mother had [employer] forms repeated at the end of the year. [employer] Teacher forms completed on 05/06/24 provided by parent: TEACHER #1  (Kelly, KAM) Inattentive:  9/9 + Hyperactive: 5 /9- ODD/CD  2/10- Anxiety/depression:  not scored SIGNIFICANT IMPAIRMENTS IN ALL OF THE FOLLOWING  . Relationships with peers   Following directions - Disrupting class - Assignment completion-. Organizational skills.   TEACHER #2 : [employer] E[employer]algebra) Inattentive:  4/9 Hyperactive:  0/9 ODD/CD  0/10 Anxiety/depression:  0 /7 IMPAIRMENTS IN MATH (4= SOMEWHAT OF A PROBLEM)  Mother had him going to [employer] as was failing math, though he did pass [employer].  He was in [employer] math, next year will be in regular (non-honors). Will be in honors history and social studies, but no other honor classes.   Damian endorses he gets distracted in the class.  He gets very distracted by any noise, even if he hears a bird chirping.  He does his homework at home and gets bored and distracted easily.  He sometimes has a hard time staying seated.  Mother notices he can't not touch everything within reach. He can't sit still, always needs to be walking around.   He can't perform multiple tasks at once.  He loses everything; he lost his student ID 3 times.  He is very disorganized. He had a lot of missing assignments.   HIs teacher had to have teacher initial his assignment book to make sure he didn't forget to write it down.  Patient denies anxiety, depression, SI, or self-injurious behavior.  Patient also denies substance use including alcohol or marijuana.     DEVELOPMENTAL HX  Child was born full term without complications and reached all age-appropriate developmental milestones without concerns. Early Intervention Services were not needed.   CURRENT SCHOOL -School: [employer] in [place], 9th grade  -Public school  -Special Ed services-No 504 plan or IEP - General education classroom -Accommodations-none -Academic performance/grades: was on honor roll, grade declined.     MEDICAL HISTORY: Denies a history of tics Denies history of starting spells, twitching, seizure-like activity.   FAMILY HISTORY: Family history of ADHD: aunt, grandmother (takes Adderall XR) Family history of anxiety or depression: denies Denies family history of cardiac conditions or early unexplained deaths.

## 2025-06-09 NOTE — ASSESSMENT
[FreeTextEntry1] : Damian is a 15 y/o male with ADHD and is here for a follow up s/p seen 04/14/25. when stimulant was swtched from Adderall XR 30 mg to Concerta..  He was diagnosed with ADHD when he was in 5th grade by [facility] and had never taken ADHD medication.   He does not have a 504 Plan as per patient preference.   Adderall XR 30 mg had some effectiveness with improvement in oppositional behaviors, however not significantly improving academics, and patient complains of side effects (feeling too quiet and not himself). Concerta 36 mg has been very effective and well tolerated for school, without mood side effects patient experienced with Adderall XR. Mother's main concern is not completing his homework assignments.  Will add as needed IR methylphenidate after school.

## 2025-06-09 NOTE — PHYSICAL EXAM
[Well-appearing] : well-appearing [Normocephalic] : normocephalic [Alert] : alert [Well related, good eye contact] : well related, good eye contact [Conversant] : conversant [Normal speech and language] : normal speech and language [Follows instructions well] : follows instructions well [Gross hearing intact] : gross hearing intact [de-identified] : Interactive and appropriate mood, friendly.

## 2025-06-09 NOTE — PHYSICAL EXAM
[Well-appearing] : well-appearing [Normocephalic] : normocephalic [Alert] : alert [Well related, good eye contact] : well related, good eye contact [Normal speech and language] : normal speech and language [Conversant] : conversant [Follows instructions well] : follows instructions well [Gross hearing intact] : gross hearing intact [de-identified] : Interactive and appropriate mood, friendly.

## 2025-06-09 NOTE — ASSESSMENT
[FreeTextEntry1] : Damian is a 15 y/o male with ADHD and is here for a follow up s/p seen 04/14/25. when stimulant was swtched from Adderall XR 30 mg to Concerta..  He was diagnosed with ADHD when he was in 5th grade by [facility] and had never taken ADHD medication.   He does not have a 504 Plan as per patient preference.   Adderall XR 30 mg had some effectiveness with improvement in oppositional behaviors, however not significantly improving academics, and patient complains of side effects (feeling too quiet and not himself). Concerta 36 mg has been very effective and well tolerated for school, without mood side effects patient experienced with Adderall XR. Mother's main concern is not completing his homework assignments.  Will add as needed IR methylphenidate after school.

## 2025-06-09 NOTE — HISTORY OF PRESENT ILLNESS
[FreeTextEntry1] : Damian is a 15 y/o male with ADHD and is here for a follow up s/p seen 04/14/25. when stimulant was swtched from Adderall XR 30 mg to Concerta..  He was diagnosed with ADHD when he was in 5th grade by [facility] and had never taken ADHD medication.  He was struggling academically, and initially seemed to be doing better however grades have declined.   He does not have a 504 Plan as per patient preference.   Adderall XR 30 mg had some effectiveness with improvement in oppositional behaviors, however not significantly improving academics, and patient complains of side effects (feeling too quiet and not himself).  PLAN FROM PREVIOUS VISIT -STOP Adderall XR 30 mg START  Concerta 18 mg once daily in the morning.   May increase to 2 X 18 mg tablets after 2 weeks if well tolerated and not effective..    INTERIM HPI  Has been taking Concerta 36 mg. Damian endorses he likes Concerta a lot better than Adderall.  He feels the same amount of focus as Mikhail but does not feel daniels like when he took Adderall.  Yesterday he took Concerta 30 min before his baseball game. and felt more anxious when he got up to bat.  Hasn't felt any side effects or anxiousness before then.    Mother feels he is doing well in school, however she can't see any changes at home. He is not doing the homework.    This summer has no formal plans.  Damian would prefer not to take ADHD meds.   _________________________________________________ HPI FROM VISIT ON 04/14/25  PLAN FROM PREVIOUS VISIT - Increase Adderall XR to 30 mg  INTERIM HPI Spoke to father 11/27/24, was having Adderall shortage issues' Sent Rx for 20 mg IR Adderall BID, recently refilled Adderall XR 30 mg (01/07 and 02/18/25). Taking Adderall XR 30 mg but not consistent as doesn't like how he feels. Patient feels he is more calm, doesn't talk as much, doesn't eat as much.  Doesn't want to do anything.  Mother feels when he doesn't take the medicine, he is much more annoying to others. She thinks he doesn't always take it. Grades are "terrible" Some 60's and 70's.  He gets 100 on homework which is done in school.  The quizzes and tests are the problems.   He comes home and rushes through his work.   No 504 Plan- doesn't feel he needs it.   __________________________________ HPI FROM VISIT ON 10/30/25  Taking Adderall XR 25 mg (did not have 20 mg in stock). Patient reports he notices it helps him to stay focused, though still loses focus in science class which is  a double period. He doesn't have a lot of homework. He found transition to high school to be smooth, and is doing better in school this year.  No missing assignments.  He denies problems taking medication.  Has been eating and sleeping well.    Father is happy with improvement in school.  He agreed no missing assignments "which is huge."   Denies problems with mood.   ________________________________ HPI FROM VISIT ON 08/14/24  Damian is a 13 y/o male with ADHD and is here for a follow up s/p seen 07/24/24 for management recommendations.  He was diagnosed with ADHD when he was in 5th grade by [facility] and has never taken ADHD medication.  He is a bright student however in the past year has been struggling academically.  Recent [employer] forms were consistent with symptoms and impairments related to ADHD, and was started on Adderall XR 10 mg.    PLAN FROM PREVIOUS VISIT -Start Adderall XR 10 mg once daily in the morning (if not available, may try Concerta).  -Psychoeducation provided re: ADHD.  Resources for education given -Discussed management for ADHD including behavioral interventions and medication options. -Counseling on ADHD medication; stimulants are 1st line treatment. Discussed stimulant medications; risks, benefits, possible side effects. -Letter to request 504 Plan for school provided -Follow up in 3 weeks.    INTERIM HPI Has been taking Adderall XR 10 mg consistently.   Patient feels less talkative, no mood, but not depressed.  He has not noticed if it helps with focus as he's not in school.  Mother has not noticed any changes.  He's always in the basement, and he's usually very quiet.  Father took him to a mental health clinic (father had an appointment for himself and inquired about Damian) at [facility] in [place].  They said he could only take him if Adderall is not being prescribed by another provider.  Mother was not there.  The therapist noted that Damian fidgeted a lot. Damian denies feeling anxious or depressed.    HPI FROM VISIT ON 07/24/24  Damian is a 13 y/o male with ADHD, here for re-evaluation and management recommendations.   Patient lives with parents and is enrolled is enrolled in 8th grade at [employer] in [place].  He was diagnosed with ADHD in 5th grade at [facility].  He had an EEG and cardiology clearance.  Medication was not recommended by neurologist at the time as he was doing very well in school. He has been taking all honors classes, however started having behavior problems, and over the past 2 years, more problems with assignment completion, missing assignments, and disrupting class.  Mother feels she needs to help with stay organized and remind him constantly to get his work done.   Had [employer] forms repeated at the beginning of the school year last year which did not show significant teacher reported symptoms, so NP at [facility] refused to start ADHD medication, stated he didn't have enough symptoms and he did well in school.  By the end of the school years, his grades declined, he failed 2 regents tests, so Mother had [place] forms repeated at the end of the year. [employer] Teacher forms completed on 05/06/24 provided by parent: TEACHER #1  (Kelly, KAM) Inattentive:  9/9 + Hyperactive: 5 /9- ODD/CD  2/10- Anxiety/depression:  not scored SIGNIFICANT IMPAIRMENTS IN ALL OF THE FOLLOWING  . Relationships with peers   Following directions - Disrupting class - Assignment completion-. Organizational skills.   TEACHER #2 : [employer]) Inattentive:  4/9 Hyperactive:  0/9 ODD/CD  0/10 Anxiety/depression:  0 /7 IMPAIRMENTS IN MATH (4= SOMEWHAT OF A PROBLEM)  Mother had him going to [employer] as was failing math, though he did pass [employer].  He was in [employer] math, next year will be in regular (non-honors). Will be in honors history and social studies, but no other honor classes.   Damian endorses he gets distracted in the class.  He gets very distracted by any noise, even if he hears a bird chirping.  He does his homework at home and gets bored and distracted easily.  He sometimes has a hard time staying seated.  Mother notices he can't not touch everything within reach. He can't sit still, always needs to be walking around.   He can't perform multiple tasks at once.  He loses everything; he lost his student ID 3 times.  He is very disorganized. He had a lot of missing assignments.   HIs teacher had to have teacher initial his assignment book to make sure he didn't forget to write it down.  Patient denies anxiety, depression, SI, or self-injurious behavior.  Patient also denies substance use including alcohol or marijuana.     DEVELOPMENTAL HX  Child was born full term without complications and reached all age-appropriate developmental milestones without concerns. Early Intervention Services were not needed.   CURRENT SCHOOL -School: [employer] in [place], 9th grade  -Public school  -Special Ed services-No 504 plan or IEP - General education classroom -Accommodations-none -Academic performance/grades: was on honor roll, grade declined.     MEDICAL HISTORY: Denies a history of tics Denies history of starting spells, twitching, seizure-like activity.   FAMILY HISTORY: Family history of ADHD: aunt, grandmother (takes Adderall XR) Family history of anxiety or depression: denies Denies family history of cardiac conditions or early unexplained deaths.

## 2025-06-09 NOTE — PLAN
[FreeTextEntry1] : -Continue Concerta 36 mg once daily after school. -Add IR  methylphenidate 5 mg after school as needed,  May increase up to 10 mg if needed. - Discussed stimulant medications, risks, benefits, possible side effects. -Discussed 504 Plan- discussed benefits; patient/parent not interested. -Follow up in 4 months (not planning to take Concerta over the summer).